# Patient Record
Sex: FEMALE | Race: WHITE | URBAN - METROPOLITAN AREA
[De-identification: names, ages, dates, MRNs, and addresses within clinical notes are randomized per-mention and may not be internally consistent; named-entity substitution may affect disease eponyms.]

---

## 2022-01-26 ENCOUNTER — OFFICE VISIT (OUTPATIENT)
Dept: URBAN - METROPOLITAN AREA CLINIC 63 | Facility: CLINIC | Age: 87
End: 2022-01-26
Payer: MEDICARE

## 2022-01-26 DIAGNOSIS — Z96.1 PRESENCE OF PSEUDOPHAKIA: ICD-10-CM

## 2022-01-26 DIAGNOSIS — E11.3293 DIABETES MELLITUS TYPE 2 WITH MILD NON-PROLIFERATIVE RETINOPATHY WITHOUT MACULAR EDEMA, BILATERAL: Primary | ICD-10-CM

## 2022-01-26 PROCEDURE — 99204 OFFICE O/P NEW MOD 45 MIN: CPT | Performed by: OPTOMETRIST

## 2022-01-26 ASSESSMENT — KERATOMETRY
OS: 41.75
OD: 41.75

## 2022-01-26 ASSESSMENT — VISUAL ACUITY
OS: 20/30
OD: 20/40

## 2022-01-26 ASSESSMENT — INTRAOCULAR PRESSURE
OD: 15
OS: 15

## 2022-01-26 NOTE — IMPRESSION/PLAN
Impression: Other secondary cataract, left eye: H26.492. Plan: PCO account for the patient's complaints. Discussed all risks, benefits, procedures and recovery. Patient desires to have YAG and referred to Dr. Danyel Law for consult.

## 2022-01-26 NOTE — IMPRESSION/PLAN
Impression: Diabetes mellitus Type 2 with mild non-proliferative retinopathy without macular edema, bilateral: C51.3505. Plan: NIDDM with mild NPDR without DME OU. Pt ed on importance of tighter blood glucose control to prevent worsening retinopathy.

## 2022-03-21 ENCOUNTER — OFFICE VISIT (OUTPATIENT)
Dept: URBAN - METROPOLITAN AREA CLINIC 63 | Facility: CLINIC | Age: 87
End: 2022-03-21
Payer: MEDICARE

## 2022-03-21 DIAGNOSIS — H26.492 OTHER SECONDARY CATARACT, LEFT EYE: Primary | ICD-10-CM

## 2022-03-21 DIAGNOSIS — H35.81 RETINAL EDEMA: ICD-10-CM

## 2022-03-21 DIAGNOSIS — E11.9 TYPE 2 DIABETES MELLITUS W/O COMPLICATION: ICD-10-CM

## 2022-03-21 PROCEDURE — 99204 OFFICE O/P NEW MOD 45 MIN: CPT | Performed by: OPHTHALMOLOGY

## 2022-03-21 PROCEDURE — 92134 CPTRZ OPH DX IMG PST SGM RTA: CPT | Performed by: OPHTHALMOLOGY

## 2022-03-21 ASSESSMENT — INTRAOCULAR PRESSURE
OD: 16
OS: 15

## 2022-03-21 NOTE — IMPRESSION/PLAN
Impression: Retinal edema: H35.81. Plan: Left eye.  etiology unclear. Retina evaluation for further treatment.

## 2022-03-21 NOTE — IMPRESSION/PLAN
Impression: Type 2 diabetes mellitus w/o complication: T53.8. Plan: Diabetes type II: no background retinopathy, no signs of neovascularization noted. Discussed ocular and systemic benefits of blood sugar control.

## 2022-12-28 ENCOUNTER — OFFICE VISIT (OUTPATIENT)
Dept: URBAN - METROPOLITAN AREA CLINIC 63 | Facility: CLINIC | Age: 87
End: 2022-12-28
Payer: MEDICARE

## 2022-12-28 DIAGNOSIS — H00.015 HORDEOLUM EXTERNUM LEFT LOWER EYELID: Primary | ICD-10-CM

## 2022-12-28 PROCEDURE — 99213 OFFICE O/P EST LOW 20 MIN: CPT | Performed by: OPTOMETRIST

## 2022-12-28 RX ORDER — NEOMYCIN SULFATE, POLYMYXIN B SULFATE AND DEXAMETHASONE 3.5; 10000; 1 MG/G; [USP'U]/G; MG/G
OINTMENT OPHTHALMIC
Qty: 3.5 | Refills: 1 | Status: ACTIVE
Start: 2022-12-28

## 2022-12-28 RX ORDER — CEPHALEXIN 500 MG/1
500 MG CAPSULE ORAL
Qty: 20 | Refills: 0 | Status: ACTIVE
Start: 2022-12-28

## 2022-12-28 ASSESSMENT — INTRAOCULAR PRESSURE
OS: 13
OD: 15

## 2022-12-28 NOTE — IMPRESSION/PLAN
Impression: Hordeolum externum left lower eyelid: H00.015. Plan: Kenazion LL. Start Cephalexin BID PO x 10 days. Start warm compresses 15min stretches, Use Maxitrol QID OD afterwords.